# Patient Record
Sex: FEMALE | Race: BLACK OR AFRICAN AMERICAN | Employment: STUDENT | URBAN - METROPOLITAN AREA
[De-identification: names, ages, dates, MRNs, and addresses within clinical notes are randomized per-mention and may not be internally consistent; named-entity substitution may affect disease eponyms.]

---
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
## 2023-03-29 ENCOUNTER — TELEPHONE (OUTPATIENT)
Dept: FAMILY MEDICINE CLINIC | Facility: CLINIC | Age: 23
End: 2023-03-29

## 2023-03-30 ENCOUNTER — PATIENT OUTREACH (OUTPATIENT)
Dept: CASE MANAGEMENT | Age: 23
End: 2023-03-30

## 2023-03-30 NOTE — PROCEDURES
The office order for PCP removal request is Approved and finalized on March 30, 2023.     Thanks,  [facility] [employer]

## 2024-02-18 NOTE — TELEPHONE ENCOUNTER
Pt's mom called asking if Dr received the shoulder Xray results that were done yesterday at [address]. None

## 2024-12-10 ENCOUNTER — TELEPHONE (OUTPATIENT)
Dept: FAMILY MEDICINE CLINIC | Facility: CLINIC | Age: 24
End: 2024-12-10

## 2024-12-10 NOTE — TELEPHONE ENCOUNTER
Medical Record Request Received    Date received in office:12/6/24    Requested from: [facility]    Records to be sent to:   [facility]  [id number] [place] [address] [address]  [place] [address]  P: [phone]  F: [phone]         Date request sent to [employer]: 12/10/24

## (undated) NOTE — Clinical Note
[facility] [employer] of Child Health Examination       Student's Name  Jose Donahue D Title                           Date    (If adding dates to the above immunization history section, put your initials by date(s) and sign here.)   ALTERNATIVE PROOF OF IMMUNITY   1 (List all prescribed or taken on a regular basis.)    Current outpatient prescriptions:   •  Doxycycline Hyclate 100 MG Oral Cap, Take 1 capsule (100 mg total) by mouth daily. , Disp: 30 capsule, Rfl: 3  •  ibuprofen 600 MG Oral Tab, Take 1 tablet (600 mg t reading) Dental: __Braces   __Bridge   __Plate   __Other  Other concerns? Ear/Hearing problems? No  Information may be shared with appropriate personnel for health /educational purposes. Bone/Joint problem/injury/scoliosis?        No  Parent/Gua Hemoglobin or Hematocrit   Sickle Cell  (when indicated)     Urinalysis   Developmental Screening Tool     SYSTEM REVIEW Normal Comments/Follow-up/Needs  Normal Comments/Follow-up/Needs   Skin Yes  Endocrine Yes    Ears Yes                      Screen resu Print Name  Alley Florentino MD                                                 Signature                                                                                Date  5/9/2017   Address/Phone  [facility], [facility], [address] [place] [address]

## (undated) NOTE — LETTER
Name:  Alysia Alexa Year:  College Class: Student ID No.:   Address:  [place] [address] Phone:  [phone] (home)  :  25year old   Name Relationship Lgl [address] Isaiah 3 Work Phone Home Phone Mobile Phone   1.  Yessenia Garibay 15. Does anyone in your family have hypertrophic cardiomyopathy, Marfan syndrome, arrhythmogenic right ventricular cardiomyopathy, long QT syndrome, short QT syndrome, Brugada syndrome, or catecholaminergic polymorphic ventricular tachycardia? No   15.  Longo 29. Have you ever had a head injury or concussion? No   35. Have you ever had a hit or blow to the head that caused confusion, prolonged headache, or memory problems? No   36. Do you have a history of seizure disorder? No   37.  Do you have headaches with e on CDC (Girls, 2-20 Years) BMI-for-age based on BMI available as of 9/14/2018. female    Vision: R 20/25          L 20/25          BOTH 20/25              MEDICAL NORMAL ABNORMAL FINDINGS   Appearance:  Marfan stigmata (kyphoscoliosis, high-arched palate, Will treat with anti-inflammatory and muscle relaxant. Recommend off sports for 1 week, and may restart activity as tolerated after this. Needs follow-up if notes recurrence of flareup. Also to get imaging done as ordered.          Physician's Signature [email or web address]             Signature of student-athlete Date Signature of parent-guardian Date        ©2010 AAFP, AAP, [facility], [facility]. for

## (undated) NOTE — MR AVS SNAPSHOT
[facility] Utilities  [address] [facility] [place], [facility] [address] [address] [phone]               Thank you for choosing us for your health care visit with Erendira Castillo MD.  We are glad to serve you and happy Vitamin D 2000 units Tabs   Take by mouth daily. Solartrec     Sign up for Solartrec access for your child.   [employer]rec access allows you to view health information for your child from their recent   visit, view other health information and mor o grow a family garden    In addition to 11, 4, 3, 2, 1 families can make small changes in their family routines to help everyone lead healthier active lives.  Try:  o Eating breakfast everyday  o Eating low-fat dairy products like yogurt, milk, and cheese

## (undated) NOTE — Clinical Note
Name:  Walker Menon School Year:  2201-9524 Class:12th Student ID No.:   [facility] [address] Phone:  [phone] (home)  :  16year old   Name Relationship Lgl [address] Isaiah 3 Work Phone Home Phone Mobile Phone   1.  Dearl Jeremy during exercise? No   11. Have you ever had an unexplained seizure? No   12. Do you get more tired or short of breath more quickly than your friends during exercise?  No   HEART HEALTH QUESTIONS ABOUT YOUR FAMILY    13. Has any family member or relative die 29. Is there anyone in your family who has asthma? No   29. Were you born without or are you missing a kidney, eye, testicle (males), spleen, or any other organ? No   30. Do you have a groin pain or a painful bulge or hernia in the groin area? No   31.  Hav 17-19. L foot fracture 2014, healed. 24. Both knees with too much running/jumping, get pain. 34. Concussion:2014, resolved  39. Contact lenses  4.  Umbilical Hernia surgery : 2003   I hereby state that, to the best of my knowledge, my answers to the ab *Considered  exam if in private setting. Having third party present is recommended. *Consider cognitive evaluation or baseline neuropsychiatric testing if a history of significant concussion.   On the basis of the examination on this day, I approve this available on the IHSA website at www. IHSA.org. We understand and agree that the results of the performance-enhancing substance testing will be held confidential to the extent required by law.  We understand that failure to provide accurate and truthful info

## (undated) NOTE — MR AVS SNAPSHOT
[facility] Utilities  [address] [facility],[address] [place], [facility] [address] [address] [phone]               Thank you for choosing us for your health care visit with Loi Pond MD.  We are glad to serve you and happy o 2 or less hours of screen time a day  o 1 or more hours of physical activity a day    To help children live healthy active lives, parents can:  o Be role models themselves by making healthy eating and daily physical activity the norm for their family.   o help with. Keep in mind that a drop in school performance can be a sign of other problems. · Friendships. Do you like your child’s friends? Do the friendships seem healthy?  Make sure to talk to your teen about who his or her friends are and how they spend No matter how your teen acts, he or she still needs a parent. Nutrition and exercise tips  Your teenager likely makes his or her own decisions about what to eat and how to spend free time.  You can’t always have the final say, but you can encourage healthy milk are the best choices. Hygiene tips  · Teenagers should bathe or shower daily and use deodorant. · Let the health care provider know if you or your teen have questions about hygiene or acne.   · Bring your teen to the dentist at least twice a year for · When your teen is old enough for a ’s license, encourage safe driving. Teach your teen to always wear a seat belt, drive the speed limit, and follow the rules of the road.  Do not allow your teenager to text or talk on a cell phone while driving. (A service agency, or hospital. Erin Islam your teen that his or her pain can be eased. Offer your love and support.  If your teen talks about death or suicide, seek help right away.      Next checkup at: _______________________________     PARENT NOTES:  Date Las - Doxycycline Hyclate 100 MG Caps  - ibuprofen 600 MG Tabs            Immunizations Administered in the Office Today     MENINGOCOCCAL       MyChart     Sign up for MyChart access for your child.   [employer] access allows you to view health information for y